# Patient Record
Sex: FEMALE | Race: BLACK OR AFRICAN AMERICAN | Employment: PART TIME | URBAN - METROPOLITAN AREA
[De-identification: names, ages, dates, MRNs, and addresses within clinical notes are randomized per-mention and may not be internally consistent; named-entity substitution may affect disease eponyms.]

---

## 2024-01-31 ENCOUNTER — HOSPITAL ENCOUNTER (OUTPATIENT)
Dept: MAMMOGRAPHY | Age: 44
Discharge: HOME | End: 2024-01-31
Payer: COMMERCIAL

## 2024-01-31 VITALS — BODY MASS INDEX: 46.82 KG/M2 | HEIGHT: 62 IN

## 2024-01-31 DIAGNOSIS — Z12.31 VISIT FOR SCREENING MAMMOGRAM: ICD-10-CM

## 2024-01-31 PROCEDURE — 77063 BREAST TOMOSYNTHESIS BI: CPT

## 2025-03-04 ENCOUNTER — HOSPITAL ENCOUNTER (OUTPATIENT)
Dept: MAMMOGRAPHY | Age: 45
Discharge: HOME | End: 2025-03-04
Payer: COMMERCIAL

## 2025-03-04 VITALS — BODY MASS INDEX: 47.8 KG/M2 | HEIGHT: 64 IN | WEIGHT: 280 LBS

## 2025-03-04 DIAGNOSIS — Z12.31 VISIT FOR SCREENING MAMMOGRAM: ICD-10-CM

## 2025-03-04 PROCEDURE — 77063 BREAST TOMOSYNTHESIS BI: CPT

## 2025-06-19 ENCOUNTER — OFFICE VISIT (OUTPATIENT)
Age: 45
End: 2025-06-19

## 2025-06-19 VITALS
BODY MASS INDEX: 45.66 KG/M2 | HEIGHT: 66 IN | HEART RATE: 64 BPM | SYSTOLIC BLOOD PRESSURE: 134 MMHG | DIASTOLIC BLOOD PRESSURE: 76 MMHG | TEMPERATURE: 98.1 F | WEIGHT: 284.1 LBS | OXYGEN SATURATION: 98 %

## 2025-06-19 DIAGNOSIS — S16.1XXA STRAIN OF NECK MUSCLE, INITIAL ENCOUNTER: Primary | ICD-10-CM

## 2025-06-19 RX ORDER — METHYLPREDNISOLONE 4 MG/1
TABLET ORAL
Qty: 1 KIT | Refills: 0 | Status: SHIPPED | OUTPATIENT
Start: 2025-06-19

## 2025-06-19 RX ORDER — LIDOCAINE 4 G/G
1 PATCH TOPICAL DAILY
COMMUNITY

## 2025-06-19 NOTE — PROGRESS NOTES
Head: Normocephalic.      Mouth/Throat:      Mouth: Mucous membranes are moist.   Eyes:      Pupils: Pupils are equal, round, and reactive to light.   Pulmonary:      Effort: Pulmonary effort is normal.   Abdominal:      Tenderness: There is no guarding.   Musculoskeletal:      bilateral trapezius tenderness with LROM of neck.  Cervical paraspinal tenderness C5 bilaterally. Thoracic and lumbar spines are non-tender.     Skin:     General: No jaundice.   Neurological:      Mental Status: Alert and oriented to person, place, and time.     An electronic signature was used to authenticate this note.    Kade Chappell, APRN - CNP

## 2025-06-19 NOTE — PATIENT INSTRUCTIONS
- Steroids such as Prednisone, Methylprednisolone, and Medrol dosepak will cause your glucose (blood sugar) levels to rise.  If you are diabetic, please monitor your glucose levels carefully. Watch your food intake and take caution with foods high in sugar and carbohydrates as weight gain is another side effect of steroid use. Elevated heart rate is another common finding with steroids. Take this medication with food as it can irritate your stomach to a similar extent that ibuprofen can.     - Muscle relaxors have a tendency to cause drowsiness so it is important that you avoid driving and use of heavy machinery while taking this drug.   - I suggest taking it for the first time at the end of the day so that you can gauge your reaction to it and how much drowsiness it may or may not cause. Some people do not get drowsy from muscle relaxors while others do.        Connie,    It was an absolute pleasure taking care of you today.  I'm hoping you'll start feeling better as soon as possible. Please call me if you have any questions or concerns about what we talked about today.  Feel free stop back in if anything changes or things seem to be getting worse.  If for some reason you develop any serious or potentially life-threatening issues, call 911 or proceed to the nearest emergency department.  Hopefully it will never come to that.  Otherwise, it was very nice to meet you Connie.      Thanks,     Timo Chappell